# Patient Record
Sex: FEMALE | Race: WHITE | Employment: UNEMPLOYED | URBAN - NONMETROPOLITAN AREA
[De-identification: names, ages, dates, MRNs, and addresses within clinical notes are randomized per-mention and may not be internally consistent; named-entity substitution may affect disease eponyms.]

---

## 2018-07-18 DIAGNOSIS — Z12.31 ENCOUNTER FOR SCREENING MAMMOGRAM FOR MALIGNANT NEOPLASM OF BREAST: ICD-10-CM

## 2018-07-18 DIAGNOSIS — K58.0 IRRITABLE BOWEL SYNDROME WITH DIARRHEA: ICD-10-CM

## 2018-07-18 PROBLEM — E11.9 DIABETES MELLITUS (HCC): Status: ACTIVE | Noted: 2018-07-18

## 2018-07-18 PROBLEM — K58.9 IRRITABLE BOWEL SYNDROME: Status: ACTIVE | Noted: 2018-07-18

## 2018-07-18 PROBLEM — E03.9 HYPOTHYROIDISM: Status: ACTIVE | Noted: 2018-07-18

## 2018-07-18 RX ORDER — DICYCLOMINE HYDROCHLORIDE 10 MG/1
10 CAPSULE ORAL
COMMUNITY

## 2018-07-18 RX ORDER — GLUCOSAMINE HCL/CHONDROITIN SU 500-400 MG
1 CAPSULE ORAL
COMMUNITY

## 2018-07-18 RX ORDER — SERTRALINE HYDROCHLORIDE 100 MG/1
100 TABLET, FILM COATED ORAL DAILY
COMMUNITY

## 2018-07-18 RX ORDER — ONDANSETRON HYDROCHLORIDE 8 MG/1
8 TABLET, FILM COATED ORAL EVERY 8 HOURS PRN
COMMUNITY

## 2018-07-18 RX ORDER — MECLIZINE HYDROCHLORIDE 25 MG/1
25 TABLET ORAL EVERY 6 HOURS
COMMUNITY

## 2018-07-18 RX ORDER — DEXTROAMPHETAMINE SACCHARATE, AMPHETAMINE ASPARTATE MONOHYDRATE, DEXTROAMPHETAMINE SULFATE AND AMPHETAMINE SULFATE 7.5; 7.5; 7.5; 7.5 MG/1; MG/1; MG/1; MG/1
30 CAPSULE, EXTENDED RELEASE ORAL EVERY MORNING
COMMUNITY

## 2018-07-18 RX ORDER — LEVOTHYROXINE SODIUM 0.15 MG/1
150 TABLET ORAL DAILY
COMMUNITY

## 2018-08-10 ENCOUNTER — OFFICE VISIT (OUTPATIENT)
Dept: OPHTHALMOLOGY | Age: 48
End: 2018-08-10
Payer: COMMERCIAL

## 2018-08-10 DIAGNOSIS — E11.9 DIABETES TYPE 2, NO OCULAR INVOLVEMENT (HCC): Primary | ICD-10-CM

## 2018-08-10 DIAGNOSIS — H25.813 COMBINED FORMS OF AGE-RELATED CATARACT OF BOTH EYES: ICD-10-CM

## 2018-08-10 PROCEDURE — 99214 OFFICE O/P EST MOD 30 MIN: CPT | Performed by: OPHTHALMOLOGY

## 2018-08-10 PROCEDURE — 92250 FUNDUS PHOTOGRAPHY W/I&R: CPT | Performed by: OPHTHALMOLOGY

## 2018-08-10 RX ORDER — TROPICAMIDE 10 MG/ML
1 SOLUTION/ DROPS OPHTHALMIC ONCE
Status: COMPLETED | OUTPATIENT
Start: 2018-08-10 | End: 2018-08-10

## 2018-08-10 RX ORDER — PHENYLEPHRINE HCL 2.5 %
1 DROPS OPHTHALMIC (EYE) ONCE
Status: COMPLETED | OUTPATIENT
Start: 2018-08-10 | End: 2018-08-10

## 2018-08-10 RX ADMIN — Medication 1 DROP: at 11:10

## 2018-08-10 RX ADMIN — TROPICAMIDE 1 DROP: 10 SOLUTION/ DROPS OPHTHALMIC at 11:11

## 2018-08-10 ASSESSMENT — SLIT LAMP EXAM - LIDS
COMMENTS: MILD BLEPHARITIS. MILD MGD
COMMENTS: MILD BLEPHARITIS. MILD MGD

## 2018-08-10 ASSESSMENT — VISUAL ACUITY
OD_SC: 20/60
OS_SC: 20/20
METHOD: SNELLEN - LINEAR
OD_PH_SC: 20/20

## 2018-08-10 ASSESSMENT — TONOMETRY
OS_IOP_MMHG: 22
OD_IOP_MMHG: 23
IOP_METHOD: NON-CONTACT AIR PUFF

## 2018-08-10 NOTE — PROGRESS NOTES
Katya Scott is a 46 y.o. female here for a complete eye exam.      Chief Complaint   Patient presents with    Annual Exam       HPI     Patient is here today to establish care, patient was referred by Ron Fritz due to patient being diabetic. Patient states she was told she has the start of cataracts. at her last eye exam.Patient forgot glasses today. Last complete [id number]  Last glasses rx: 2015  Cataract sx:N/A  Diabetes: IDDM- Tresiba, metformin HgbA1c: 6.9-08/18          ROS      Main Ophthalmology Exam     Slit Lamp Exam       Right Left    Lids/Lashes Mild Blepharitis. Mild MGD Mild Blepharitis.  Mild MGD    Conjunctiva/Sclera Clear and white Clear and white    Cornea Clear Clear    Anterior Chamber Deep, no cells, no flare Deep, no cells, no flare    Iris Round and reactive Round and reactive    Lens 2+ Nuclear sclerosis, 2+ Cortical cataract 2+ Nuclear sclerosis, 1+ Cortical cataract          Fundus Exam       Right Left    Vitreous Vitreous syneresis Vitreous syneresis    Disc No edema and no pallor No edema and no pallor    C/D Ratio Vertical 0.2 0.2    C/D Ratio Horizontal 0.2 0.2    Macula Normal architecture Normal architecture    Vessels Normal course and caliber Normal course and caliber    Periphery No breaks, tears, or detachments No breaks, tears, or detachments                   Tonometry     Tonometry (Non-contact air puff, 10:15 AM)       Right Left    Pressure 23 22              Visual Acuity     Visual Acuity (Snellen - Linear)       Right Left    Dist sc 20/60 20/20    Dist ph sc 20/20                Not recorded          Not recorded          Not recorded         Not recorded          Past Medical History:   Diagnosis Date    ADHD     Diabetes mellitus ([facility] [address]     TYPE 2    Hypoactive thyroid     Neurologic disorder     FIBROMYALGIA    Seizures (HCC)     LAST ONE >10 YRS          Current Outpatient Prescriptions:     levothyroxine (SYNTHROID) 150 MCG tablet, Take 150 mcg by mouth regarding the importance of routine   ophthalmic examinations to monitor cataracts. Advised pt to exercise caution while operating a   motor vehicle or performing other critical visual tasks. Educated pt about various treatments for Cataracts   including Observation, Manifest Refraction, and Cataract Surgery. New MRx with Dr. Jasper PATEL.     Electronically signed by Margarita Hammer MD on 8/10/2018 at 10:58 AM

## 2018-08-17 PROBLEM — Z12.31 ENCOUNTER FOR SCREENING MAMMOGRAM FOR MALIGNANT NEOPLASM OF BREAST: Status: RESOLVED | Noted: 2018-07-18 | Resolved: 2018-08-17

## 2018-08-30 ENCOUNTER — OFFICE VISIT (OUTPATIENT)
Dept: OPTOMETRY | Age: 48
End: 2018-08-30

## 2018-08-30 DIAGNOSIS — H52.4 ASTIGMATISM OF BOTH EYES WITH PRESBYOPIA: Primary | ICD-10-CM

## 2018-08-30 DIAGNOSIS — H52.203 ASTIGMATISM OF BOTH EYES WITH PRESBYOPIA: Primary | ICD-10-CM

## 2018-08-30 PROCEDURE — 99203 OFFICE O/P NEW LOW 30 MIN: CPT | Performed by: OPTOMETRIST

## 2018-08-30 ASSESSMENT — REFRACTION_CURRENTRX
OS_SPHERE: +1.00
OD_SPHERE: -0.75
OS_BRAND: B AND L ULTRA FOR ASTIGMATISM
OS_CYLINDER: -0.75
OD_CYLINDER: DS
OD_BRAND: B AND L ULTRA
OS_AXIS: 110

## 2018-08-30 ASSESSMENT — REFRACTION_MANIFEST
OD_AXIS: 070
OS_ADD: +2.00
OS_SPHERE: +1.00
OD_CYLINDER: -0.25
OS_AXIS: 110
OD_ADD: +2.00
OD_SPHERE: -0.75
OS_CYLINDER: -1.00

## 2018-08-30 ASSESSMENT — VISUAL ACUITY
OS_CC: 20/25
OS_CC+: -2
CORRECTION_TYPE: GLASSES
OD_CC+: -1
METHOD: SNELLEN - LINEAR
OD_CC: 20/40 OU

## 2018-08-30 ASSESSMENT — REFRACTION_WEARINGRX
OS_CYLINDER: -2.50
OD_ADD: +2.00
OD_AXIS: 068
OD_CYLINDER: -1.25
OD_SPHERE: -0.25
OS_AXIS: 110
OS_ADD: +2.00
SPECS_TYPE: BIFOCAL
OS_SPHERE: +1.75

## 2018-08-30 ASSESSMENT — KERATOMETRY
OS_AXISANGLE2_DEGREES: 169
OS_K2POWER_DIOPTERS: 43.50
OD_AXISANGLE_DEGREES: 083
OS_AXISANGLE_DEGREES: 079
OS_K1POWER_DIOPTERS: 42.75
OD_K1POWER_DIOPTERS: 42.75
OD_AXISANGLE2_DEGREES: 173
OD_K2POWER_DIOPTERS: 43.50

## 2018-08-30 ASSESSMENT — TONOMETRY
IOP_METHOD: NON-CONTACT AIR PUFF
OD_IOP_MMHG: 16
OS_IOP_MMHG: 13

## 2018-09-11 ENCOUNTER — OFFICE VISIT (OUTPATIENT)
Dept: OPTOMETRY | Age: 48
End: 2018-09-11

## 2018-09-11 DIAGNOSIS — H52.11 MYOPIA OF RIGHT EYE WITH ASTIGMATISM: Primary | ICD-10-CM

## 2018-09-11 DIAGNOSIS — H52.202 HYPEROPIA OF LEFT EYE WITH ASTIGMATISM: ICD-10-CM

## 2018-09-11 DIAGNOSIS — H52.201 MYOPIA OF RIGHT EYE WITH ASTIGMATISM: Primary | ICD-10-CM

## 2018-09-11 DIAGNOSIS — H52.02 HYPEROPIA OF LEFT EYE WITH ASTIGMATISM: ICD-10-CM

## 2018-09-11 PROCEDURE — 99999 PR OFFICE/OUTPT VISIT,PROCEDURE ONLY: CPT | Performed by: OPTOMETRIST

## 2018-09-11 ASSESSMENT — REFRACTION_WEARINGRX
OD_AXIS: 070
OD_ADD: +2.00
OD_SPHERE: -0.75
OS_CYLINDER: -1.00
OD_CYLINDER: -0.25
OS_AXIS: 110
OS_ADD: +2.00
OS_SPHERE: +1.00
SPECS_TYPE: BIFOCAL

## 2018-09-11 ASSESSMENT — VISUAL ACUITY
OD_CC: 20/60 OU
METHOD: SNELLEN - LINEAR
OD_CC+: -1
CORRECTION_TYPE: CONTACTS

## 2018-09-11 ASSESSMENT — REFRACTION_CURRENTRX
OS_AXIS: 110
OD_BRAND: BAUSCH LOMB ULTRA
OS_SPHERE: +1.00
OS_CYLINDER: -0.75
OD_SPHERE: -0.75

## 2018-09-11 NOTE — PROGRESS NOTES
Olena Abel presents today for   Chief Complaint   Patient presents with    Follow-up   . HPI     2 week contact lens follow up  Patient likes the fit and feel of her glasses; she has noticed that the contacts start to wear out after a couple of weeks  She sometimes feels like she is able to see better close up then far away. Has been getting better at getting them in and out. Visual Acuity (Snellen - Linear)       Right Left    Dist cc 20/30 OU -1     Near cc 20/60 OU     Correction:  Contacts          Contact Lens Current Rx     Current Contact Lens Rx       Brand Sphere Cylinder Axis    Right [employer] Ultra -0.75      Left [employer] Ultra AStigmatism +1.00 -0.75 110                FINAL RX  Final Contact Lens Rx       Brand Base Curve Sphere Cylinder Axis    Right [employer] Ultra 8.6 -0.75      Left [employer] Ultra AStigmatism 8.6 +1.00 -0.75 110    Expiration Date:  9/12/2019    Replacement:  Monthly    Wearing Schedule:  Daily wear    Final          rx printed     1. Myopia of right eye with astigmatism    2. Hyperopia of left eye with astigmatism         Patient Instructions   Printed rx. Always take the lenses out if eyes are red or irritated;   If taking the lenses out does not improve the symptoms return for an office visit      Return in about 1 year (around 9/11/2019) for complete eye exam.

## 2021-04-07 LAB — SARS-COV-2: NORMAL
